# Patient Record
Sex: FEMALE | Race: BLACK OR AFRICAN AMERICAN | URBAN - METROPOLITAN AREA
[De-identification: names, ages, dates, MRNs, and addresses within clinical notes are randomized per-mention and may not be internally consistent; named-entity substitution may affect disease eponyms.]

---

## 2022-07-27 ENCOUNTER — TELEPHONE ENCOUNTER (OUTPATIENT)
Dept: URBAN - METROPOLITAN AREA CLINIC 92 | Facility: CLINIC | Age: 77
End: 2022-07-27

## 2022-07-28 ENCOUNTER — TELEPHONE ENCOUNTER (OUTPATIENT)
Dept: URBAN - METROPOLITAN AREA CLINIC 92 | Facility: CLINIC | Age: 77
End: 2022-07-28

## 2022-07-28 ENCOUNTER — OFFICE VISIT (OUTPATIENT)
Dept: URBAN - METROPOLITAN AREA TELEHEALTH 2 | Facility: TELEHEALTH | Age: 77
End: 2022-07-28
Payer: MEDICARE

## 2022-07-28 VITALS — WEIGHT: 146 LBS | HEIGHT: 59 IN | BODY MASS INDEX: 29.43 KG/M2

## 2022-07-28 DIAGNOSIS — R85.89: ICD-10-CM

## 2022-07-28 DIAGNOSIS — I63.9 CVA (CEREBRAL INFARCTION): ICD-10-CM

## 2022-07-28 DIAGNOSIS — Z79.02 ANTIPLATELET OR ANTITHROMBOTIC LONG-TERM USE: ICD-10-CM

## 2022-07-28 PROCEDURE — 99243 OFF/OP CNSLTJ NEW/EST LOW 30: CPT | Performed by: INTERNAL MEDICINE

## 2022-07-28 PROCEDURE — 99203 OFFICE O/P NEW LOW 30 MIN: CPT | Performed by: INTERNAL MEDICINE

## 2022-07-28 RX ORDER — LOVASTATIN 40 MG/1
TABLET ORAL
Qty: 0 | Refills: 0 | COMMUNITY
Start: 1900-01-01

## 2022-07-28 RX ORDER — POLYETHYLENE GLYCOL-3350, SODIUM CHLORIDE, POTASSIUM CHLORIDE AND SODIUM BICARBONATE 420; 11.2; 5.72; 1.48 G/438.4G; G/438.4G; G/438.4G; G/438.4G
AS DIRECTED POWDER, FOR SOLUTION ORAL ONCE
Qty: 1 GALLON | Refills: 1 | OUTPATIENT
Start: 2022-07-28 | End: 2022-07-30

## 2022-07-28 RX ORDER — GLIPIZIDE 5 MG/1
TABLET ORAL
Qty: 0 | Refills: 0 | COMMUNITY
Start: 1900-01-01

## 2022-07-28 NOTE — HPI-TODAY'S VISIT:
Patient was referred by Dr. Parks for an evaluation of positive cologuard.  A copy of this note will be sent to the referring provider   Denies abd pain N/V diarrhea constipation hematochezia melena jaundice unintentional wt loss   Denies dyspepsia htbrn dysphagia odynophagia food impaction CP cough anorexia light headedness   Denies scleral icterus, increased abd girth, LE edema, confusion, disorientation, memory loss, hematemesis  NSAID usage: occas tylenol  EtOH: occas wine / Tob: no  Fam Hx GI cancer: no  Prior colon 10y ago normal

## 2022-08-15 ENCOUNTER — OFFICE VISIT (OUTPATIENT)
Dept: URBAN - METROPOLITAN AREA SURGERY CENTER 16 | Facility: SURGERY CENTER | Age: 77
End: 2022-08-15
Payer: MEDICARE

## 2022-08-15 DIAGNOSIS — R19.5 ABNORMAL CONSISTENCY OF STOOL: ICD-10-CM

## 2022-08-15 PROCEDURE — G8907 PT DOC NO EVENTS ON DISCHARG: HCPCS | Performed by: INTERNAL MEDICINE

## 2022-08-15 PROCEDURE — 45378 DIAGNOSTIC COLONOSCOPY: CPT | Performed by: INTERNAL MEDICINE

## 2022-08-15 RX ORDER — GLIPIZIDE 5 MG/1
TABLET ORAL
Qty: 0 | Refills: 0 | COMMUNITY
Start: 1900-01-01

## 2022-08-15 RX ORDER — LOVASTATIN 40 MG/1
TABLET ORAL
Qty: 0 | Refills: 0 | COMMUNITY
Start: 1900-01-01

## 2023-01-14 ENCOUNTER — DASHBOARD ENCOUNTERS (OUTPATIENT)
Age: 78
End: 2023-01-14

## 2023-01-14 PROBLEM — 55822004 HYPERLIPIDAEMIA: Status: ACTIVE | Noted: 2022-07-28

## 2023-01-14 PROBLEM — 230690007 CEREBROVASCULAR ACCIDENT: Status: ACTIVE | Noted: 2022-07-28

## 2023-01-14 PROBLEM — 73211009 DM - DIABETES MELLITUS: Status: ACTIVE | Noted: 2022-07-28

## 2023-01-14 PROBLEM — 710814002 LONG-TERM CURRENT USE OF DRUG THERAPY: Status: ACTIVE | Noted: 2022-07-28

## 2023-01-14 PROBLEM — 38341003 HYPERTENSION: Status: ACTIVE | Noted: 2022-07-28

## 2023-01-18 ENCOUNTER — OFFICE VISIT (OUTPATIENT)
Dept: URBAN - METROPOLITAN AREA TELEHEALTH 2 | Facility: TELEHEALTH | Age: 78
End: 2023-01-18
Payer: MEDICARE

## 2023-01-18 VITALS — WEIGHT: 146 LBS | BODY MASS INDEX: 28.66 KG/M2 | HEIGHT: 60 IN

## 2023-01-18 DIAGNOSIS — R85.89: ICD-10-CM

## 2023-01-18 DIAGNOSIS — I10 HTN (HYPERTENSION): ICD-10-CM

## 2023-01-18 DIAGNOSIS — Z79.02 ANTIPLATELET OR ANTITHROMBOTIC LONG-TERM USE: ICD-10-CM

## 2023-01-18 DIAGNOSIS — I63.9 CVA (CEREBRAL INFARCTION): ICD-10-CM

## 2023-01-18 PROCEDURE — 99212 OFFICE O/P EST SF 10 MIN: CPT | Performed by: INTERNAL MEDICINE

## 2023-01-18 PROCEDURE — 99213 OFFICE O/P EST LOW 20 MIN: CPT | Performed by: INTERNAL MEDICINE

## 2023-01-18 RX ORDER — LOVASTATIN 40 MG/1
TABLET ORAL
Qty: 0 | Refills: 0 | COMMUNITY
Start: 1900-01-01

## 2023-01-18 RX ORDER — GLIPIZIDE 5 MG/1
TABLET ORAL
Qty: 0 | Refills: 0 | COMMUNITY
Start: 1900-01-01

## 2023-01-18 NOTE — HPI-TODAY'S VISIT:
wt stable# over 6mo (was 146)  8/15/22 colonoscopy WNL (previously positive cologuard)   Denies abd pain N/V diarrhea constipation hematochezia melena jaundice unintentional wt loss   Denies dyspepsia htbrn dysphagia odynophagia food impaction CP cough anorexia light headedness   Denies scleral icterus, increased abd girth, LE edema, confusion, disorientation, memory loss, hematemesis  NSAID usage: occas tylenol  EtOH: occas wine / Tob: no  Fam Hx GI cancer: no